# Patient Record
Sex: MALE | Race: BLACK OR AFRICAN AMERICAN | NOT HISPANIC OR LATINO | Employment: UNEMPLOYED | URBAN - METROPOLITAN AREA
[De-identification: names, ages, dates, MRNs, and addresses within clinical notes are randomized per-mention and may not be internally consistent; named-entity substitution may affect disease eponyms.]

---

## 2022-07-30 ENCOUNTER — APPOINTMENT (EMERGENCY)
Dept: CT IMAGING | Facility: HOSPITAL | Age: 42
End: 2022-07-30

## 2022-07-30 ENCOUNTER — HOSPITAL ENCOUNTER (EMERGENCY)
Facility: HOSPITAL | Age: 42
Discharge: HOME | End: 2022-07-30
Attending: EMERGENCY MEDICINE

## 2022-07-30 VITALS
DIASTOLIC BLOOD PRESSURE: 86 MMHG | RESPIRATION RATE: 18 BRPM | WEIGHT: 160.5 LBS | SYSTOLIC BLOOD PRESSURE: 135 MMHG | HEART RATE: 102 BPM | TEMPERATURE: 97.9 F | OXYGEN SATURATION: 98 %

## 2022-07-30 DIAGNOSIS — K40.90 RIGHT INGUINAL HERNIA: Primary | ICD-10-CM

## 2022-07-30 LAB
ALBUMIN SERPL BCP-MCNC: 4.5 G/DL (ref 3.5–5)
ALP SERPL-CCNC: 87 U/L (ref 43–122)
ALT SERPL W P-5'-P-CCNC: 37 U/L
ANION GAP SERPL CALCULATED.3IONS-SCNC: 7 MMOL/L (ref 5–14)
AST SERPL W P-5'-P-CCNC: 59 U/L (ref 17–59)
BASOPHILS # BLD AUTO: 0.07 THOUSANDS/ΜL (ref 0–0.1)
BASOPHILS NFR BLD AUTO: 1 % (ref 0–1)
BILIRUB SERPL-MCNC: 0.34 MG/DL (ref 0.2–1)
BUN SERPL-MCNC: 19 MG/DL (ref 5–25)
CALCIUM SERPL-MCNC: 9.4 MG/DL (ref 8.4–10.2)
CHLORIDE SERPL-SCNC: 104 MMOL/L (ref 96–108)
CO2 SERPL-SCNC: 28 MMOL/L (ref 21–32)
CREAT SERPL-MCNC: 1.11 MG/DL (ref 0.7–1.5)
EOSINOPHIL # BLD AUTO: 0.47 THOUSAND/ΜL (ref 0–0.61)
EOSINOPHIL NFR BLD AUTO: 4 % (ref 0–6)
ERYTHROCYTE [DISTWIDTH] IN BLOOD BY AUTOMATED COUNT: 13.7 % (ref 11.6–15.1)
GFR SERPL CREATININE-BSD FRML MDRD: 82 ML/MIN/1.73SQ M
GLUCOSE SERPL-MCNC: 104 MG/DL (ref 70–99)
HCT VFR BLD AUTO: 41.4 % (ref 36.5–49.3)
HGB BLD-MCNC: 14.5 G/DL (ref 12–17)
IMM GRANULOCYTES # BLD AUTO: 0.02 THOUSAND/UL (ref 0–0.2)
IMM GRANULOCYTES NFR BLD AUTO: 0 % (ref 0–2)
LACTATE SERPL-SCNC: 1.5 MMOL/L (ref 0.5–2)
LYMPHOCYTES # BLD AUTO: 2.85 THOUSANDS/ΜL (ref 0.6–4.47)
LYMPHOCYTES NFR BLD AUTO: 26 % (ref 14–44)
MAGNESIUM SERPL-MCNC: 1.9 MG/DL (ref 1.6–2.3)
MCH RBC QN AUTO: 27.3 PG (ref 26.8–34.3)
MCHC RBC AUTO-ENTMCNC: 35 G/DL (ref 31.4–37.4)
MCV RBC AUTO: 78 FL (ref 82–98)
MONOCYTES # BLD AUTO: 0.69 THOUSAND/ΜL (ref 0.17–1.22)
MONOCYTES NFR BLD AUTO: 6 % (ref 4–12)
NEUTROPHILS # BLD AUTO: 7.09 THOUSANDS/ΜL (ref 1.85–7.62)
NEUTS SEG NFR BLD AUTO: 63 % (ref 43–75)
NRBC BLD AUTO-RTO: 0 /100 WBCS
PHOSPHATE SERPL-MCNC: 4.3 MG/DL (ref 2.5–4.8)
PLATELET # BLD AUTO: 242 THOUSANDS/UL (ref 149–390)
PMV BLD AUTO: 9.5 FL (ref 8.9–12.7)
POTASSIUM SERPL-SCNC: 4.5 MMOL/L (ref 3.5–5.3)
PROT SERPL-MCNC: 7.6 G/DL (ref 6.4–8.4)
RBC # BLD AUTO: 5.32 MILLION/UL (ref 3.88–5.62)
SODIUM SERPL-SCNC: 139 MMOL/L (ref 135–147)
WBC # BLD AUTO: 11.19 THOUSAND/UL (ref 4.31–10.16)

## 2022-07-30 PROCEDURE — 83735 ASSAY OF MAGNESIUM: CPT | Performed by: EMERGENCY MEDICINE

## 2022-07-30 PROCEDURE — 96375 TX/PRO/DX INJ NEW DRUG ADDON: CPT

## 2022-07-30 PROCEDURE — 99285 EMERGENCY DEPT VISIT HI MDM: CPT | Performed by: EMERGENCY MEDICINE

## 2022-07-30 PROCEDURE — 99284 EMERGENCY DEPT VISIT MOD MDM: CPT

## 2022-07-30 PROCEDURE — 36415 COLL VENOUS BLD VENIPUNCTURE: CPT | Performed by: EMERGENCY MEDICINE

## 2022-07-30 PROCEDURE — 80053 COMPREHEN METABOLIC PANEL: CPT | Performed by: EMERGENCY MEDICINE

## 2022-07-30 PROCEDURE — 96374 THER/PROPH/DIAG INJ IV PUSH: CPT

## 2022-07-30 PROCEDURE — 74177 CT ABD & PELVIS W/CONTRAST: CPT

## 2022-07-30 PROCEDURE — 84100 ASSAY OF PHOSPHORUS: CPT | Performed by: EMERGENCY MEDICINE

## 2022-07-30 PROCEDURE — G1004 CDSM NDSC: HCPCS

## 2022-07-30 PROCEDURE — 83605 ASSAY OF LACTIC ACID: CPT | Performed by: EMERGENCY MEDICINE

## 2022-07-30 PROCEDURE — 85025 COMPLETE CBC W/AUTO DIFF WBC: CPT | Performed by: EMERGENCY MEDICINE

## 2022-07-30 PROCEDURE — 96361 HYDRATE IV INFUSION ADD-ON: CPT

## 2022-07-30 RX ORDER — KETOROLAC TROMETHAMINE 30 MG/ML
15 INJECTION, SOLUTION INTRAMUSCULAR; INTRAVENOUS ONCE
Status: DISCONTINUED | OUTPATIENT
Start: 2022-07-30 | End: 2022-07-30

## 2022-07-30 RX ORDER — NAPROXEN 375 MG/1
375 TABLET ORAL 2 TIMES DAILY WITH MEALS
Qty: 20 TABLET | Refills: 0 | Status: SHIPPED | OUTPATIENT
Start: 2022-07-30

## 2022-07-30 RX ORDER — KETOROLAC TROMETHAMINE 30 MG/ML
15 INJECTION, SOLUTION INTRAMUSCULAR; INTRAVENOUS ONCE
Status: COMPLETED | OUTPATIENT
Start: 2022-07-30 | End: 2022-07-30

## 2022-07-30 RX ORDER — MORPHINE SULFATE 4 MG/ML
4 INJECTION, SOLUTION INTRAMUSCULAR; INTRAVENOUS ONCE
Status: COMPLETED | OUTPATIENT
Start: 2022-07-30 | End: 2022-07-30

## 2022-07-30 RX ORDER — ONDANSETRON 2 MG/ML
4 INJECTION INTRAMUSCULAR; INTRAVENOUS ONCE
Status: COMPLETED | OUTPATIENT
Start: 2022-07-30 | End: 2022-07-30

## 2022-07-30 RX ADMIN — SODIUM CHLORIDE 1000 ML: 0.9 INJECTION, SOLUTION INTRAVENOUS at 14:09

## 2022-07-30 RX ADMIN — ONDANSETRON 4 MG: 2 INJECTION INTRAMUSCULAR; INTRAVENOUS at 14:10

## 2022-07-30 RX ADMIN — IOHEXOL 100 ML: 350 INJECTION, SOLUTION INTRAVENOUS at 14:36

## 2022-07-30 RX ADMIN — MORPHINE SULFATE 4 MG: 4 INJECTION INTRAVENOUS at 14:08

## 2022-07-30 RX ADMIN — KETOROLAC TROMETHAMINE 15 MG: 30 INJECTION, SOLUTION INTRAMUSCULAR; INTRAVENOUS at 15:28

## 2022-07-30 NOTE — ED NOTES
RN contacted [facility]/[address], they do not have an escort at this time to pick him up  [facility] house requested that we order a LYFT for this patient       Alissa Zhou RN  07/30/22 0132

## 2022-07-30 NOTE — ED PROVIDER NOTES
History  Chief Complaint   Patient presents with    Abdominal Pain    Groin Pain     Right sided into right groin  States feels tearing and moving  Pain began 1hr after eating lunch  +N  States took tylenol approx 2hrs PTA  53-year-old male presented emergency room with right inguinal pain  Patient notes this started suddenly on the last 1-2 hours  Has had a right inguinal hernia for a few months now  Denies fevers chills cough congestion rhinorrhea  Denies scrotal pain  Denies dysuria hematuria  Nausea but no vomiting  History provided by:  Patient  Abdominal Pain  Pain location:  RLQ  Pain quality: aching and sharp    Pain radiates to:  Does not radiate  Pain severity:  Severe  Onset quality:  Sudden  Duration:  2 hours  Timing:  Constant  Progression:  Worsening  Chronicity:  New  Relieved by:  Nothing  Worsened by:  Nothing  Associated symptoms: nausea    Associated symptoms: no chest pain, no chills, no cough, no dysuria, no fever, no hematuria, no shortness of breath, no sore throat and no vomiting    Groin Pain  Presenting symptoms: no dysuria    Associated symptoms: abdominal pain, groin pain and nausea    Associated symptoms: no fever, no hematuria and no vomiting        None       History reviewed  No pertinent past medical history  History reviewed  No pertinent surgical history  History reviewed  No pertinent family history  I have reviewed and agree with the history as documented  E-Cigarette/Vaping     E-Cigarette/Vaping Substances     Social History     Tobacco Use    Smoking status: Current Every Day Smoker     Packs/day: 0 25     Types: Cigarettes    Smokeless tobacco: Never Used   Substance Use Topics    Alcohol use: Not Currently     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    Drug use: Never       Review of Systems   Constitutional: Negative for chills and fever  HENT: Negative for ear pain and sore throat  Eyes: Negative for pain and visual disturbance  Respiratory: Negative for cough and shortness of breath  Cardiovascular: Negative for chest pain and palpitations  Gastrointestinal: Positive for abdominal pain and nausea  Negative for vomiting  Genitourinary: Negative for dysuria and hematuria  Musculoskeletal: Negative for arthralgias and back pain  Skin: Negative for color change and rash  Neurological: Negative for seizures and syncope  All other systems reviewed and are negative  Physical Exam  Physical Exam  Vitals and nursing note reviewed  Constitutional:       Appearance: He is well-developed  HENT:      Head: Normocephalic and atraumatic  Nose: Nose normal       Mouth/Throat:      Mouth: Mucous membranes are moist    Eyes:      Conjunctiva/sclera: Conjunctivae normal    Cardiovascular:      Rate and Rhythm: Normal rate and regular rhythm  Heart sounds: No murmur heard  Pulmonary:      Effort: Pulmonary effort is normal  No respiratory distress  Breath sounds: Normal breath sounds  Abdominal:      General: Abdomen is flat  Palpations: Abdomen is soft  Tenderness: There is no abdominal tenderness  Hernia: A hernia is present  Comments: Right groin hernia, unable to reduce, tender to palpation  No overlying erythema or warmth  Genitourinary:     Penis: Normal        Testes: Normal    Musculoskeletal:      Cervical back: Neck supple  Skin:     General: Skin is warm and dry  Capillary Refill: Capillary refill takes less than 2 seconds  Neurological:      Mental Status: He is alert and oriented to person, place, and time           Vital Signs  ED Triage Vitals   Temperature Pulse Respirations Blood Pressure SpO2   07/30/22 1354 07/30/22 1354 07/30/22 1354 07/30/22 1354 07/30/22 1354   97 9 °F (36 6 °C) 102 18 135/86 98 %      Temp Source Heart Rate Source Patient Position - Orthostatic VS BP Location FiO2 (%)   07/30/22 1354 07/30/22 1354 07/30/22 1354 07/30/22 1354 --   Tympanic Monitor Sitting Left arm       Pain Score       07/30/22 1408       10 - Worst Possible Pain           Vitals:    07/30/22 1354   BP: 135/86   Pulse: 102   Patient Position - Orthostatic VS: Sitting         Visual Acuity      ED Medications  Medications   sodium chloride 0 9 % bolus 1,000 mL (1,000 mL Intravenous New Bag 7/30/22 1409)   morphine injection 4 mg (4 mg Intravenous Given 7/30/22 1408)   ondansetron (ZOFRAN) injection 4 mg (4 mg Intravenous Given 7/30/22 1410)   iohexol (OMNIPAQUE) 350 MG/ML injection (SINGLE-DOSE) 100 mL (100 mL Intravenous Given 7/30/22 1436)   ketorolac (TORADOL) injection 15 mg (15 mg Intravenous Given 7/30/22 1528)       Diagnostic Studies  Results Reviewed     Procedure Component Value Units Date/Time    Magnesium [[id number]]  (Normal) Collected: 07/30/22 1403    Lab Status: Final result Specimen: Blood from Arm, Left Updated: 07/30/22 1426     Magnesium 1 9 mg/dL     Phosphorus [[id number]]  (Normal) Collected: 07/30/22 1403    Lab Status: Final result Specimen: Blood from Arm, Left Updated: 07/30/22 1426     Phosphorus 4 3 mg/dL     Comprehensive metabolic panel [[id number]]  (Abnormal) Collected: 07/30/22 1403    Lab Status: Final result Specimen: Blood from Arm, Left Updated: 07/30/22 1426     Sodium 139 mmol/L      Potassium 4 5 mmol/L      Chloride 104 mmol/L      CO2 28 mmol/L      ANION GAP 7 mmol/L      BUN 19 mg/dL      Creatinine 1 11 mg/dL      Glucose 104 mg/dL      Calcium 9 4 mg/dL      AST 59 U/L      ALT 37 U/L      Alkaline Phosphatase 87 U/L      Total Protein 7 6 g/dL      Albumin 4 5 g/dL      Total Bilirubin 0 34 mg/dL      eGFR 82 ml/min/1 73sq m     Narrative:      Oscar guidelines for Chronic Kidney Disease (CKD):     Stage 1 with normal or high GFR (GFR > 90 mL/min/1 73 square meters)    Stage 2 Mild CKD (GFR = 60-89 mL/min/1 73 square meters)    Stage 3A Moderate CKD (GFR = 45-59 mL/min/1 73 square meters)    Stage 3B Moderate CKD (GFR = 30-44 mL/min/1 73 square meters)    Stage 4 Severe CKD (GFR = 15-29 mL/min/1 73 square meters)    Stage 5 End Stage CKD (GFR <15 mL/min/1 73 square meters)  Note: GFR calculation is accurate only with a steady state creatinine    Lactic acid [[id number]]  (Normal) Collected: 07/30/22 1403    Lab Status: Final result Specimen: Blood from Arm, Left Updated: 07/30/22 1424     LACTIC ACID 1 5 mmol/L     Narrative:      Result may be elevated if tourniquet was used during collection  CBC and differential [[id number]]  (Abnormal) Collected: 07/30/22 1403    Lab Status: Final result Specimen: Blood from Arm, Left Updated: 07/30/22 1411     WBC 11 19 Thousand/uL      RBC 5 32 Million/uL      Hemoglobin 14 5 g/dL      Hematocrit 41 4 %      MCV 78 fL      MCH 27 3 pg      MCHC 35 0 g/dL      RDW 13 7 %      MPV 9 5 fL      Platelets 493 Thousands/uL      nRBC 0 /100 WBCs      Neutrophils Relative 63 %      Immat GRANS % 0 %      Lymphocytes Relative 26 %      Monocytes Relative 6 %      Eosinophils Relative 4 %      Basophils Relative 1 %      Neutrophils Absolute 7 09 Thousands/µL      Immature Grans Absolute 0 02 Thousand/uL      Lymphocytes Absolute 2 85 Thousands/µL      Monocytes Absolute 0 69 Thousand/µL      Eosinophils Absolute 0 47 Thousand/µL      Basophils Absolute 0 07 Thousands/µL                  CT abdomen pelvis with contrast   Final Result by Eloisa Bolton MD (07/30 8944)      Small right inguinal hernia containing a short segment of small intestine without evidence of obstruction  Workstation performed: [id number]                    Procedures  Procedures         ED Course                               SBIRT 20yo+    Flowsheet Row Most Recent Value   SBIRT (23 yo +)    In order to provide better care to our patients, we are screening all of our patients for alcohol and drug use  Would it be okay to ask you these screening questions?  Unable to answer at this time Filed at: 07/30/2022 1410                    [facility]  Number of Diagnoses or Management Options  Right inguinal hernia  Diagnosis management comments: After morphine I was able to reduce the hernia  CT shows non strangulated right inguinal hernia  Will discharge with outpatient surgery follow-up  Disposition  Final diagnoses:   Right inguinal hernia     Time reflects when diagnosis was documented in both MDM as applicable and the Disposition within this note     Time User Action Codes Description Comment    7/30/2022  3:41 PM Bigg Mac Candy [K40 90] Right inguinal hernia       ED Disposition     ED Disposition   Discharge    Condition   Stable    Date/Time   Sat Jul 30, 2022  3:40 PM    Comment   Crow Gallagher discharge to home/self care                 Follow-up Information     Follow up With Specialties Details Why Contact Info    Hamlet Curiel MD General Surgery   [address] [place], [place] [phone]            Patient's Medications   Discharge Prescriptions    NAPROXEN (NAPROSYN) 375 MG TABLET    Take 1 tablet (375 mg total) by mouth 2 (two) times a day with meals       Start Date: 7/30/2022 End Date: --       Order Dose: 375 mg       Quantity: 20 tablet    Refills: 0           PDMP Review     None          ED Provider  Electronically Signed by           Dennis Islas MD  07/30/22 4032

## 2022-08-10 ENCOUNTER — CONSULT (OUTPATIENT)
Dept: SURGERY | Facility: CLINIC | Age: 42
End: 2022-08-10
Payer: COMMERCIAL

## 2022-08-10 VITALS
TEMPERATURE: 99 F | OXYGEN SATURATION: 99 % | DIASTOLIC BLOOD PRESSURE: 78 MMHG | HEART RATE: 80 BPM | WEIGHT: 156 LBS | SYSTOLIC BLOOD PRESSURE: 132 MMHG

## 2022-08-10 DIAGNOSIS — K40.90 RIGHT INGUINAL HERNIA: ICD-10-CM

## 2022-08-10 PROCEDURE — 99244 OFF/OP CNSLTJ NEW/EST MOD 40: CPT | Performed by: SPECIALIST

## 2022-08-10 RX ORDER — CEFAZOLIN SODIUM 2 G/50ML
2000 SOLUTION INTRAVENOUS ONCE
Status: CANCELLED | OUTPATIENT
Start: 2022-08-26

## 2022-08-10 NOTE — H&P
Chief Complaint:  Recurrent right inguinal hernia      History of Present Illness:  Patient is a 26-year-old black male who was recently seen in the emergency room with right groin pain  He said he was doing somewhat strenuous activity when he felt tearing and groin pain on the right side  He since developed a bulge in the area  He improved with conservative therapy in the emergency room and he was referred to our office for evaluation  In discussing the situation with the patient he says he has fairly significant discomfort when he moves walks etcetera  He denies any nausea vomiting diarrhea constipation  Patient states he had a right inguinal hernia repair when he was a an infant  Past Medical History: History reviewed  No pertinent past medical history  Past Surgical History:  History reviewed  No pertinent surgical history  Right inguinal herniorrhaphy as an infant    Allergies:  No Known Allergies      Medications:    Current Outpatient Medications:     naproxen (NAPROSYN) 375 mg tablet, Take 1 tablet (375 mg total) by mouth 2 (two) times a day with meals, Disp: 20 tablet, Rfl: 0      Social History:  Social History     Social History     Substance and Sexual Activity   Alcohol Use Not Currently    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     Social History     Substance and Sexual Activity   Drug Use Never     Social History     Tobacco Use   Smoking Status Current Every Day Smoker    Packs/day: 0 25    Types: Cigarettes   Smokeless Tobacco Never Used         Family History:  History reviewed  No pertinent family history  Review of Systems:    Right groin pain as per the HPI  No weight loss weight gain fever chills night sweats chest pain nausea vomiting diarrhea constipation shortness of breath headaches blurry vision double vision sore throat chronic cough dysuria hematuria      Vitals:  Vitals:    08/10/22 1241   BP: 132/78   Pulse: 80   Temp: 99 °F (37 2 °C)   SpO2: 99%       Physical Exam:  The patient is a young adult black male 5 ft 6 in 156 lb  He is awake alert no distress    Vital signs as above    Skin warm dry  Head normocephalic and atraumatic  Eyes AUREA a m  Intact  Ears and nose within normal limits  Throat gag reflex intact  Neck no masses thyromegaly lymphadenopathy palpable  Back no CVA or spinal tenderness  Lungs clear to a and P  Cor regular rate and rhythm no murmurs carotid bruits  Abdomen flat firm nontender left groin shows no evidence of a hernia  Right groin demonstrates a bulge and also a small well-healed incision just above the bulge  Bulge is reducible with the patient in a supine position  No other abdominal masses or hernias noted  Extremities negative CC E  Neurologically A&O x3 cranial nerves 2-12 intact  Lymphatics no lymphadenopathy palpable      Lab Results: I have personally reviewed pertinent reports  See below  Imaging: I have personally reviewed pertinent imaging studies primarily CT scan of the abdomen and pelvis  EKG, Pathology, and Other Studies: I have personally reviewed pertinent reports  No visits with results within 1 Day(s) from this visit     Latest known visit with results is:   Admission on 07/30/2022, Discharged on 07/30/2022   Component Date Value    WBC 07/30/2022 11 19 (A)    RBC 07/30/2022 5 32     Hemoglobin 07/30/2022 14 5     Hematocrit 07/30/2022 41 4     MCV 07/30/2022 78 (A)    MCH 07/30/2022 27 3     MCHC 07/30/2022 35 0     RDW 07/30/2022 13 7     MPV 07/30/2022 9 5     Platelets 26/39/8334 242     nRBC 07/30/2022 0     Neutrophils Relative 07/30/2022 63     Immat GRANS % 07/30/2022 0     Lymphocytes Relative 07/30/2022 26     Monocytes Relative 07/30/2022 6     Eosinophils Relative 07/30/2022 4     Basophils Relative 07/30/2022 1     Neutrophils Absolute 07/30/2022 7 09     Immature Grans Absolute 07/30/2022 0 02     Lymphocytes Absolute 07/30/2022 2 85     Monocytes Absolute 07/30/2022 0 69     Eosinophils Absolute 07/30/2022 0 47     Basophils Absolute 07/30/2022 0 07     Sodium 07/30/2022 139     Potassium 07/30/2022 4 5     Chloride 07/30/2022 104     CO2 07/30/2022 28     ANION GAP 07/30/2022 7     BUN 07/30/2022 19     Creatinine 07/30/2022 1 11     Glucose 07/30/2022 104 (A)    Calcium 07/30/2022 9 4     AST 07/30/2022 59     ALT 07/30/2022 37     Alkaline Phosphatase 07/30/2022 87     Total Protein 07/30/2022 7 6     Albumin 07/30/2022 4 5     Total Bilirubin 07/30/2022 0 34     eGFR 07/30/2022 82     Phosphorus 07/30/2022 4 3     Magnesium 07/30/2022 1 9     LACTIC ACID 07/30/2022 1 5          Impression:  Recurrent right inguinal hernia  Symptomatic  Plan:  Repair open with mesh under local with IV sedation at the earliest possible date

## 2022-08-10 NOTE — LETTER
August 10, 2022     [place], [address] [facility] [address]    Patient: Rusty South   YOB: 1980   Date of Visit: 8/10/2022       Dear Betty Bermeo,    Thank you for referring Rusty South to me for evaluation  Below are the relevant portions of my H&P  If you have questions, please do not hesitate to call me  I look forward to following Stanley Dakins along with you  Sincerely,    Valeri Peck MD        CC: No Recipients  Finn Clark MD  8/10/2022  5:24 PM  Signed  Chief Complaint:  Recurrent right inguinal hernia      History of Present Illness:  Patient is a 51-year-old black male who was recently seen in the emergency room with right groin pain  He said he was doing somewhat strenuous activity when he felt tearing and groin pain on the right side  He since developed a bulge in the area  He improved with conservative therapy in the emergency room and he was referred to our office for evaluation  In discussing the situation with the patient he says he has fairly significant discomfort when he moves walks etcetera  He denies any nausea vomiting diarrhea constipation  Patient states he had a right inguinal hernia repair when he was a an infant  Past Medical History: History reviewed  No pertinent past medical history  Past Surgical History:  History reviewed  No pertinent surgical history    Right inguinal herniorrhaphy as an infant    Allergies:  No Known Allergies      Medications:    Current Outpatient Medications:     naproxen (NAPROSYN) 375 mg tablet, Take 1 tablet (375 mg total) by mouth 2 (two) times a day with meals, Disp: 20 tablet, Rfl: 0      Social History:  Social History     Social History     Substance and Sexual Activity   Alcohol Use Not Currently    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     Social History     Substance and Sexual Activity   Drug Use Never     Social History     Tobacco Use   Smoking Status Current Every Day Smoker    Packs/day: 0 25    Types: Cigarettes   Smokeless Tobacco Never Used         Family History:  History reviewed  No pertinent family history  Review of Systems:    Right groin pain as per the HPI  No weight loss weight gain fever chills night sweats chest pain nausea vomiting diarrhea constipation shortness of breath headaches blurry vision double vision sore throat chronic cough dysuria hematuria  Vitals:  Vitals:    08/10/22 1241   BP: 132/78   Pulse: 80   Temp: 99 °F (37 2 °C)   SpO2: 99%       Physical Exam:  The patient is a young adult black male 5 ft 6 in 156 lb  He is awake alert no distress    Vital signs as above    Skin warm dry  Head normocephalic and atraumatic  Eyes AUREA a m  Intact  Ears and nose within normal limits  Throat gag reflex intact  Neck no masses thyromegaly lymphadenopathy palpable  Back no CVA or spinal tenderness  Lungs clear to a and P  Cor regular rate and rhythm no murmurs carotid bruits  Abdomen flat firm nontender left groin shows no evidence of a hernia  Right groin demonstrates a bulge and also a small well-healed incision just above the bulge  Bulge is reducible with the patient in a supine position  No other abdominal masses or hernias noted  Extremities negative CC E  Neurologically A&O x3 cranial nerves 2-12 intact  Lymphatics no lymphadenopathy palpable      Lab Results: I have personally reviewed pertinent reports  See below  Imaging: I have personally reviewed pertinent imaging studies primarily CT scan of the abdomen and pelvis  EKG, Pathology, and Other Studies: I have personally reviewed pertinent reports  No visits with results within 1 Day(s) from this visit     Latest known visit with results is:   Admission on 07/30/2022, Discharged on 07/30/2022   Component Date Value    WBC 07/30/2022 11 19 (A)    RBC 07/30/2022 5 32     Hemoglobin 07/30/2022 14 5     Hematocrit 07/30/2022 41 4     MCV 07/30/2022 78 (A)    MCH 07/30/2022 27 3     MCHC 07/30/2022 35 0     RDW 07/30/2022 13 7     MPV 07/30/2022 9 5     Platelets 77/00/1427 242     nRBC 07/30/2022 0     Neutrophils Relative 07/30/2022 63     Immat GRANS % 07/30/2022 0     Lymphocytes Relative 07/30/2022 26     Monocytes Relative 07/30/2022 6     Eosinophils Relative 07/30/2022 4     Basophils Relative 07/30/2022 1     Neutrophils Absolute 07/30/2022 7 09     Immature Grans Absolute 07/30/2022 0 02     Lymphocytes Absolute 07/30/2022 2 85     Monocytes Absolute 07/30/2022 0 69     Eosinophils Absolute 07/30/2022 0 47     Basophils Absolute 07/30/2022 0 07     Sodium 07/30/2022 139     Potassium 07/30/2022 4 5     Chloride 07/30/2022 104     CO2 07/30/2022 28     ANION GAP 07/30/2022 7     BUN 07/30/2022 19     Creatinine 07/30/2022 1 11     Glucose 07/30/2022 104 (A)    Calcium 07/30/2022 9 4     AST 07/30/2022 59     ALT 07/30/2022 37     Alkaline Phosphatase 07/30/2022 87     Total Protein 07/30/2022 7 6     Albumin 07/30/2022 4 5     Total Bilirubin 07/30/2022 0 34     eGFR 07/30/2022 82     Phosphorus 07/30/2022 4 3     Magnesium 07/30/2022 1 9     LACTIC ACID 07/30/2022 1 5          Impression:  Recurrent right inguinal hernia  Symptomatic  Plan:  Repair open with mesh under local with IV sedation at the earliest possible date

## 2022-08-14 ENCOUNTER — HOSPITAL ENCOUNTER (EMERGENCY)
Facility: HOSPITAL | Age: 42
Discharge: HOME | End: 2022-08-14
Attending: EMERGENCY MEDICINE
Payer: COMMERCIAL

## 2022-08-14 ENCOUNTER — APPOINTMENT (EMERGENCY)
Dept: CT IMAGING | Facility: HOSPITAL | Age: 42
End: 2022-08-14
Payer: COMMERCIAL

## 2022-08-14 VITALS
HEART RATE: 82 BPM | DIASTOLIC BLOOD PRESSURE: 91 MMHG | WEIGHT: 162 LBS | SYSTOLIC BLOOD PRESSURE: 140 MMHG | TEMPERATURE: 98.2 F | RESPIRATION RATE: 16 BRPM | OXYGEN SATURATION: 98 %

## 2022-08-14 DIAGNOSIS — K40.90 INGUINAL HERNIA: Primary | ICD-10-CM

## 2022-08-14 DIAGNOSIS — R10.9 ABDOMINAL PAIN: ICD-10-CM

## 2022-08-14 LAB
ALBUMIN SERPL BCP-MCNC: 4 G/DL (ref 3.5–5)
ALP SERPL-CCNC: 82 U/L (ref 46–116)
ALT SERPL W P-5'-P-CCNC: 28 U/L (ref 12–78)
ANION GAP SERPL CALCULATED.3IONS-SCNC: 8 MMOL/L (ref 4–13)
AST SERPL W P-5'-P-CCNC: 17 U/L (ref 5–45)
BASOPHILS # BLD AUTO: 0.08 THOUSANDS/ΜL (ref 0–0.1)
BASOPHILS NFR BLD AUTO: 1 % (ref 0–1)
BILIRUB SERPL-MCNC: 0.31 MG/DL (ref 0.2–1)
BILIRUB UR QL STRIP: NEGATIVE
BUN SERPL-MCNC: 18 MG/DL (ref 5–25)
CALCIUM SERPL-MCNC: 9.5 MG/DL (ref 8.3–10.1)
CHLORIDE SERPL-SCNC: 105 MMOL/L (ref 96–108)
CLARITY UR: CLEAR
CO2 SERPL-SCNC: 26 MMOL/L (ref 21–32)
COLOR UR: YELLOW
CREAT SERPL-MCNC: 1.27 MG/DL (ref 0.6–1.3)
EOSINOPHIL # BLD AUTO: 0.23 THOUSAND/ΜL (ref 0–0.61)
EOSINOPHIL NFR BLD AUTO: 2 % (ref 0–6)
ERYTHROCYTE [DISTWIDTH] IN BLOOD BY AUTOMATED COUNT: 13.4 % (ref 11.6–15.1)
GFR SERPL CREATININE-BSD FRML MDRD: 70 ML/MIN/1.73SQ M
GLUCOSE SERPL-MCNC: 131 MG/DL (ref 65–140)
GLUCOSE UR STRIP-MCNC: NEGATIVE MG/DL
HCT VFR BLD AUTO: 39.5 % (ref 36.5–49.3)
HGB BLD-MCNC: 14 G/DL (ref 12–17)
HGB UR QL STRIP.AUTO: NEGATIVE
IMM GRANULOCYTES # BLD AUTO: 0.04 THOUSAND/UL (ref 0–0.2)
IMM GRANULOCYTES NFR BLD AUTO: 0 % (ref 0–2)
KETONES UR STRIP-MCNC: NEGATIVE MG/DL
LACTATE SERPL-SCNC: 1.7 MMOL/L (ref 0.5–2)
LEUKOCYTE ESTERASE UR QL STRIP: NEGATIVE
LYMPHOCYTES # BLD AUTO: 2.69 THOUSANDS/ΜL (ref 0.6–4.47)
LYMPHOCYTES NFR BLD AUTO: 23 % (ref 14–44)
MAGNESIUM SERPL-MCNC: 1.9 MG/DL (ref 1.6–2.6)
MCH RBC QN AUTO: 27.4 PG (ref 26.8–34.3)
MCHC RBC AUTO-ENTMCNC: 35.4 G/DL (ref 31.4–37.4)
MCV RBC AUTO: 77 FL (ref 82–98)
MONOCYTES # BLD AUTO: 0.67 THOUSAND/ΜL (ref 0.17–1.22)
MONOCYTES NFR BLD AUTO: 6 % (ref 4–12)
NEUTROPHILS # BLD AUTO: 8.08 THOUSANDS/ΜL (ref 1.85–7.62)
NEUTS SEG NFR BLD AUTO: 68 % (ref 43–75)
NITRITE UR QL STRIP: NEGATIVE
NRBC BLD AUTO-RTO: 0 /100 WBCS
PH UR STRIP.AUTO: 6.5 [PH]
PLATELET # BLD AUTO: 227 THOUSANDS/UL (ref 149–390)
PMV BLD AUTO: 10.3 FL (ref 8.9–12.7)
POTASSIUM SERPL-SCNC: 4.1 MMOL/L (ref 3.5–5.3)
PROT SERPL-MCNC: 7.1 G/DL (ref 6.4–8.4)
PROT UR STRIP-MCNC: NEGATIVE MG/DL
RBC # BLD AUTO: 5.11 MILLION/UL (ref 3.88–5.62)
SODIUM SERPL-SCNC: 139 MMOL/L (ref 135–147)
SP GR UR STRIP.AUTO: 1.01 (ref 1–1.03)
UROBILINOGEN UR QL STRIP.AUTO: 0.2 E.U./DL
WBC # BLD AUTO: 11.79 THOUSAND/UL (ref 4.31–10.16)

## 2022-08-14 PROCEDURE — 74177 CT ABD & PELVIS W/CONTRAST: CPT

## 2022-08-14 PROCEDURE — G1004 CDSM NDSC: HCPCS

## 2022-08-14 PROCEDURE — 80053 COMPREHEN METABOLIC PANEL: CPT | Performed by: EMERGENCY MEDICINE

## 2022-08-14 PROCEDURE — 83605 ASSAY OF LACTIC ACID: CPT | Performed by: EMERGENCY MEDICINE

## 2022-08-14 PROCEDURE — 96374 THER/PROPH/DIAG INJ IV PUSH: CPT

## 2022-08-14 PROCEDURE — 99244 OFF/OP CNSLTJ NEW/EST MOD 40: CPT | Performed by: SURGERY

## 2022-08-14 PROCEDURE — 96375 TX/PRO/DX INJ NEW DRUG ADDON: CPT

## 2022-08-14 PROCEDURE — 99284 EMERGENCY DEPT VISIT MOD MDM: CPT | Performed by: EMERGENCY MEDICINE

## 2022-08-14 PROCEDURE — 83735 ASSAY OF MAGNESIUM: CPT | Performed by: EMERGENCY MEDICINE

## 2022-08-14 PROCEDURE — 96361 HYDRATE IV INFUSION ADD-ON: CPT

## 2022-08-14 PROCEDURE — 36415 COLL VENOUS BLD VENIPUNCTURE: CPT | Performed by: EMERGENCY MEDICINE

## 2022-08-14 PROCEDURE — 81003 URINALYSIS AUTO W/O SCOPE: CPT | Performed by: EMERGENCY MEDICINE

## 2022-08-14 PROCEDURE — 85025 COMPLETE CBC W/AUTO DIFF WBC: CPT | Performed by: EMERGENCY MEDICINE

## 2022-08-14 PROCEDURE — 99285 EMERGENCY DEPT VISIT HI MDM: CPT

## 2022-08-14 PROCEDURE — 96376 TX/PRO/DX INJ SAME DRUG ADON: CPT

## 2022-08-14 RX ORDER — LORAZEPAM 2 MG/ML
0.5 INJECTION INTRAMUSCULAR ONCE
Status: COMPLETED | OUTPATIENT
Start: 2022-08-14 | End: 2022-08-14

## 2022-08-14 RX ORDER — KETOROLAC TROMETHAMINE 30 MG/ML
1 INJECTION, SOLUTION INTRAMUSCULAR; INTRAVENOUS ONCE
Status: COMPLETED | OUTPATIENT
Start: 2022-08-14 | End: 2022-08-14

## 2022-08-14 RX ORDER — HYDROMORPHONE HCL/PF 1 MG/ML
1 SYRINGE (ML) INJECTION ONCE
Status: COMPLETED | OUTPATIENT
Start: 2022-08-14 | End: 2022-08-14

## 2022-08-14 RX ORDER — FENTANYL CITRATE 50 UG/ML
50 INJECTION, SOLUTION INTRAMUSCULAR; INTRAVENOUS ONCE
Status: COMPLETED | OUTPATIENT
Start: 2022-08-14 | End: 2022-08-14

## 2022-08-14 RX ORDER — ONDANSETRON 2 MG/ML
4 INJECTION INTRAMUSCULAR; INTRAVENOUS ONCE
Status: COMPLETED | OUTPATIENT
Start: 2022-08-14 | End: 2022-08-14

## 2022-08-14 RX ORDER — TRAMADOL HYDROCHLORIDE 50 MG/1
50 TABLET ORAL EVERY 6 HOURS PRN
Qty: 8 TABLET | Refills: 0 | Status: SHIPPED | OUTPATIENT
Start: 2022-08-14 | End: 2022-08-16

## 2022-08-14 RX ADMIN — HYDROMORPHONE HYDROCHLORIDE 1 MG: 1 INJECTION, SOLUTION INTRAMUSCULAR; INTRAVENOUS; SUBCUTANEOUS at 20:35

## 2022-08-14 RX ADMIN — IOHEXOL 70 ML: 350 INJECTION, SOLUTION INTRAVENOUS at 21:05

## 2022-08-14 RX ADMIN — ONDANSETRON 4 MG: 2 INJECTION INTRAMUSCULAR; INTRAVENOUS at 19:35

## 2022-08-14 RX ADMIN — SODIUM CHLORIDE 1000 ML: 0.9 INJECTION, SOLUTION INTRAVENOUS at 19:38

## 2022-08-14 RX ADMIN — FENTANYL CITRATE 50 MCG: 50 INJECTION, SOLUTION INTRAMUSCULAR; INTRAVENOUS at 20:14

## 2022-08-14 RX ADMIN — HYDROMORPHONE HYDROCHLORIDE 1 MG: 1 INJECTION, SOLUTION INTRAMUSCULAR; INTRAVENOUS; SUBCUTANEOUS at 19:36

## 2022-08-14 RX ADMIN — LORAZEPAM 0.5 MG: 2 INJECTION INTRAMUSCULAR; INTRAVENOUS at 20:46

## 2022-08-14 RX ADMIN — FENTANYL CITRATE 50 MCG: 50 INJECTION, SOLUTION INTRAMUSCULAR; INTRAVENOUS at 22:01

## 2022-08-14 NOTE — ED PROVIDER NOTES
History  Chief Complaint   Patient presents with    Abdominal Pain     Per EMS pt walked up 4 stair then started with acute abd pain RLQ  Hx of hernia tear same area x1 month ago took tylenol pre hospital was given Toradol by EMS     Patient is a 82-year-old male otherwise healthy coming in today with sudden onset of right groin pain  Patient states he had an inguinal hernia repair went down when he was a child  He was due for scheduled hernia up this upcoming month  He is walking up the stairs when he felt a pop and he has exquisite pain to the right groin  He has no fevers chills  He has no nausea vomiting diarrhea  He is urinating well  He has no trauma to the site  History provided by:  Patient and EMS personnel   used: No    Abdominal Pain  Pain location:  RLQ  Pain quality: cramping, gnawing, pressure and throbbing    Pain radiates to:  Does not radiate  Pain severity:  Moderate  Onset quality:  Sudden  Timing:  Constant  Chronicity:  New  Context: not alcohol use, not awakening from sleep, not diet changes, not eating, not laxative use, not medication withdrawal, not previous surgeries, not recent illness, not recent sexual activity, not recent travel, not retching, not sick contacts, not suspicious food intake and not trauma    Relieved by:  Nothing  Worsened by:  Nothing  Ineffective treatments:  Acetaminophen, OTC medications and NSAIDs  Associated symptoms: no anorexia, no belching, no chest pain, no chills, no constipation, no cough, no diarrhea, no dysuria, no fatigue, no fever, no flatus, no hematemesis, no hematochezia, no hematuria, no nausea, no shortness of breath, no sore throat and no vomiting    Risk factors: no alcohol abuse, no aspirin use, not elderly, has not had multiple surgeries, no NSAID use, not obese and no recent hospitalization        Prior to Admission Medications   Prescriptions Last Dose Informant Patient Reported?  Taking?   naproxen (NAPROSYN) 375 mg tablet   No No   Sig: Take 1 tablet (375 mg total) by mouth 2 (two) times a day with meals      Facility-Administered Medications: None       History reviewed  No pertinent past medical history  History reviewed  No pertinent surgical history  History reviewed  No pertinent family history  I have reviewed and agree with the history as documented  E-Cigarette/Vaping    E-Cigarette Use Never User      E-Cigarette/Vaping Substances    Nicotine No     THC No     CBD No     Flavoring No     Other No     Unknown No      Social History     Tobacco Use    Smoking status: Current Every Day Smoker     Packs/day: 0 25     Types: Cigarettes    Smokeless tobacco: Never Used   Vaping Use    Vaping Use: Never used   Substance Use Topics    Alcohol use: Not Currently     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    Drug use: Never       Review of Systems   Constitutional: Negative  Negative for chills, fatigue and fever  HENT: Negative  Negative for ear pain and sore throat  Eyes: Negative for pain and visual disturbance  Respiratory: Negative  Negative for cough and shortness of breath  Cardiovascular: Negative for chest pain and palpitations  Gastrointestinal: Positive for abdominal pain  Negative for anorexia, constipation, diarrhea, flatus, hematemesis, hematochezia, nausea and vomiting  Genitourinary: Negative  Negative for dysuria and hematuria  Musculoskeletal: Negative  Negative for arthralgias and back pain  Skin: Negative  Negative for color change and rash  Neurological: Negative  Negative for seizures and syncope  Hematological: Negative  Psychiatric/Behavioral: Negative  All other systems reviewed and are negative  Physical Exam  Physical Exam  Vitals and nursing note reviewed  Exam conducted with a chaperone present  Constitutional:       General: He is in acute distress  Appearance: He is well-developed  HENT:      Head: Normocephalic and atraumatic  Comments: Patient maintaining airway and secretions  No stridor   No brawniness under tongue  Eyes:      Extraocular Movements: Extraocular movements intact  Conjunctiva/sclera: Conjunctivae normal       Pupils: Pupils are equal, round, and reactive to light  Cardiovascular:      Rate and Rhythm: Normal rate and regular rhythm  Pulses:           Radial pulses are 2+ on the right side and 2+ on the left side  Dorsalis pedis pulses are 2+ on the right side and 2+ on the left side  Heart sounds: Normal heart sounds, S1 normal and S2 normal  No murmur heard  Pulmonary:      Effort: Pulmonary effort is normal  No respiratory distress  Breath sounds: Normal breath sounds  Abdominal:      Palpations: Abdomen is soft  Tenderness: There is no abdominal tenderness  Hernia: A hernia is present  Hernia is present in the right inguinal area  Genitourinary:     Penis: Normal        Testes: Normal    Musculoskeletal:      Cervical back: Neck supple  Right lower leg: No edema  Left lower leg: No edema  Skin:     General: Skin is warm and dry  Capillary Refill: Capillary refill takes less than 2 seconds  Neurological:      General: No focal deficit present  Mental Status: He is alert and oriented to person, place, and time  GCS: GCS eye subscore is 4  GCS verbal subscore is 5  GCS motor subscore is 6  Cranial Nerves: Cranial nerves are intact  Sensory: Sensation is intact  Motor: Motor function is intact  Coordination: Coordination is intact     Psychiatric:         Mood and Affect: Mood normal          Behavior: Behavior normal          Vital Signs  ED Triage Vitals [08/14/22 1921]   Temperature Pulse Respirations Blood Pressure SpO2   98 2 °F (36 8 °C) 88 20 152/79 98 %      Temp Source Heart Rate Source Patient Position - Orthostatic VS BP Location FiO2 (%)   Oral Monitor Sitting Right arm --      Pain Score       8 Vitals:    08/14/22 1921   BP: 152/79   Pulse: 88   Patient Position - Orthostatic VS: Sitting         Visual Acuity      ED Medications  Medications   sodium chloride 0 9 % bolus 1,000 mL (1,000 mL Intravenous New Bag 8/14/22 1938)   fentanyl citrate (PF) 100 MCG/2ML 50 mcg (has no administration in time range)   ketorolac (FOR EMS ONLY) (TORADOL) injection 30 mg (0 mg Does not apply Given to EMS 8/14/22 1940)   HYDROmorphone (DILAUDID) injection 1 mg (1 mg Intravenous Given 8/14/22 1936)   ondansetron (ZOFRAN) injection 4 mg (4 mg Intravenous Given 8/14/22 1935)       Diagnostic Studies  Results Reviewed     Procedure Component Value Units Date/Time    Lactic acid [[id number]]  (Normal) Collected: 08/14/22 1929    Lab Status: Final result Specimen: Blood from Arm, Left Updated: 08/14/22 2006     LACTIC ACID 1 7 mmol/L     Narrative:      Result may be elevated if tourniquet was used during collection      Comprehensive metabolic panel [[id number]] Collected: 08/14/22 1929    Lab Status: Final result Specimen: Blood from Arm, Left Updated: 08/14/22 2003     Sodium 139 mmol/L      Potassium 4 1 mmol/L      Chloride 105 mmol/L      CO2 26 mmol/L      ANION GAP 8 mmol/L      BUN 18 mg/dL      Creatinine 1 27 mg/dL      Glucose 131 mg/dL      Calcium 9 5 mg/dL      AST 17 U/L      ALT 28 U/L      Alkaline Phosphatase 82 U/L      Total Protein 7 1 g/dL      Albumin 4 0 g/dL      Total Bilirubin 0 31 mg/dL      eGFR 70 ml/min/1 73sq m     Narrative:      Oscar guidelines for Chronic Kidney Disease (CKD):     Stage 1 with normal or high GFR (GFR > 90 mL/min/1 73 square meters)    Stage 2 Mild CKD (GFR = 60-89 mL/min/1 73 square meters)    Stage 3A Moderate CKD (GFR = 45-59 mL/min/1 73 square meters)    Stage 3B Moderate CKD (GFR = 30-44 mL/min/1 73 square meters)    Stage 4 Severe CKD (GFR = 15-29 mL/min/1 73 square meters)    Stage 5 End Stage CKD (GFR <15 mL/min/1 73 square meters)  Note: GFR calculation is accurate only with a steady state creatinine    Magnesium [[id number]]  (Normal) Collected: 08/14/22 1929    Lab Status: Final result Specimen: Blood from Arm, Left Updated: 08/14/22 2003     Magnesium 1 9 mg/dL     UA (URINE) with reflex to Scope [[id number]] Collected: 08/14/22 1942    Lab Status: Final result Specimen: Urine, Clean Catch Updated: 08/14/22 1957     Color, UA Yellow     Clarity, UA Clear     Specific Gravity, UA 1 015     pH, UA 6 5     Leukocytes, UA Negative     Nitrite, UA Negative     Protein, UA Negative mg/dl      Glucose, UA Negative mg/dl      Ketones, UA Negative mg/dl      Urobilinogen, UA 0 2 E U /dl      Bilirubin, UA Negative     Occult Blood, UA Negative    CBC and differential [[id number]]  (Abnormal) Collected: 08/14/22 1929    Lab Status: Final result Specimen: Blood from Arm, Left Updated: 08/14/22 1947     WBC 11 79 Thousand/uL      RBC 5 11 Million/uL      Hemoglobin 14 0 g/dL      Hematocrit 39 5 %      MCV 77 fL      MCH 27 4 pg      MCHC 35 4 g/dL      RDW 13 4 %      MPV 10 3 fL      Platelets 119 Thousands/uL      nRBC 0 /100 WBCs      Neutrophils Relative 68 %      Immat GRANS % 0 %      Lymphocytes Relative 23 %      Monocytes Relative 6 %      Eosinophils Relative 2 %      Basophils Relative 1 %      Neutrophils Absolute 8 08 Thousands/µL      Immature Grans Absolute 0 04 Thousand/uL      Lymphocytes Absolute 2 69 Thousands/µL      Monocytes Absolute 0 67 Thousand/µL      Eosinophils Absolute 0 23 Thousand/µL      Basophils Absolute 0 08 Thousands/µL                  CT abdomen pelvis with contrast    (Results Pending)              Procedures  Procedures         ED Course  ED Course as of 08/16/22 1612   Sun Aug 14, 2022   1933 Patient is a 28-year-old male coming in today with sudden onset of right lower quadrant pain  On exam he does have a hernia that is tender but soft  Patient has significant pain in this region    Will review chart, check basic labs as well as CT    Portions of the record may have been created with voice recognition software  Occasional wrong word or "sound a like" substitutions may have occurred due to the inherent limitations of voice recognition software  Read the chart carefully and recognize, using context, where substitutions have occurred  1940 Patient was seen at [address] [place] [address] in July for same as well as followed up with Dr Crow Nichols with a scheduled surgery for August 26 2009 Patient was given Toradol and route as well as Tylenol  He was given Dilaudid and Zofran but has no change in pain  Reached out to General surgery resident will be down for evaluation  Put fentanyl order an as well patient does have mild leukocytosis otherwise   2051 General surgery resident was able to place patient in reverse Trendelenburg with ice and reduce hernia  Consult placed  Will continue with CT   2114 Patient resting in bed  Patient has mild swelling to the right inguinal region however resting in bed more comfortably soft  2141 Patient with continued pain  Will repeat Fentanyl  2207 CT without evidence of obstruction or incarceration  General surgery was down prior and reduce  Discussed with him for pain control as well as follow-up  Return to ER instructions  Patient also understands need for abdominal bider     2212 Patient resting in bed  Patient is in reverse Trendelenburg with ice pack  He does have a small inguinal hernia but it is soft, nontender  Reducible  Discussed with patient that he will need to follow up and continue his surgery on August 26  Will give abdominal binder and tramadol  RTED instructions given to patient  SBIRT 22yo+    Flowsheet Row Most Recent Value   SBIRT (25 yo +)    In order to provide better care to our patients, we are screening all of our patients for alcohol and drug use  Would it be okay to ask you these screening questions?  No Filed at: 08/14/2022 2954 MDM  Number of Diagnoses or Management Options  Diagnosis management comments:     Differential diagnosis includes but not limited to:  Appendicitis, viral syndrome, constipation, AMI, NSTEMI, pneumonia, pneuothorax, gerd, gastritis,  mesenteric ischemia, mesenteric adenitis, pancreatitis, cholecystitis, choledocholithiasis, hepatitis, bowel obstruction, ileus, gastroenteritis, colitis, malignancy, AAA, perforation, toxicologic poisoning, renal infarct, acute kidney injury, splenic infarct, splenic injury, nephrolithiasis, UTI, muscular strain, intra-abdominal hematoma, hernia, testicular torsion, epididymitis, varicocele, hydrocele, phimosis, paraphimosis, balanitis proctitis, rectal pain, rectal prolapse, hemorrhoids, perirectal abscess, anorectal fistula          Amount and/or Complexity of Data Reviewed  Clinical lab tests: ordered and reviewed  Tests in the radiology section of CPT®: reviewed and ordered  Tests in the medicine section of CPT®: ordered and reviewed  Review and summarize past medical records: yes  Independent visualization of images, tracings, or specimens: yes        Disposition  Final diagnoses:   None     ED Disposition     None      Follow-up Information    None         Patient's Medications   Discharge Prescriptions    No medications on file       No discharge procedures on file      PDMP Review     None          ED Provider  Electronically Signed by           José Miguel Miranda DO  08/16/22 1981

## 2022-08-14 NOTE — Clinical Note
Daria Choi was seen and treated in our emergency department on 8/14/2022  Diagnosis:     Silvio Brnuson    He may return on this date: If you have any questions or concerns, please don't hesitate to call        Alex Deras DO    ______________________________           _______________          _______________  Hospital Representative                              Date                                Time

## 2022-08-15 NOTE — DISCHARGE INSTRUCTIONS
NO HEAVING LIFTING OR LIFTING OF ANY KIND  2  WEAR THE ABDOMINAL BINDER AT ALL TIMES  3     CALL DR ASHLEY FOR FOLLOW UP

## 2022-08-15 NOTE — ED NOTES
Group home called and states they are sending someone to  patient  Patient appropriate to wait in waiting room  Ambulatory with steady gait at time of discharge       Hector Brenner RN  08/14/22 0095

## 2022-08-15 NOTE — CONSULTS
Consultation - General Surgery   Josselyn Crockett 38 y o  male MRN: [id number]  Unit/Bed#: ED 16 Encounter: [id number]    Assessment/Plan     Assessment:  39 yo M with PMH of pediatric [facility] who presents with reducible right inguinal hernia    Vitals normal on room air  WBC 11 79  LA 1 7    Plan:  Hernia reduced at bedside  Proceed with planned elective repair on 8/26  Recommend bracing the hernia with binder/brace and avoiding heavy lifting until his hernia is repaired  Ok to discharge from the ED after period of observation    History of Present Illness     HPI:  Josselyn Crockett is a 38 y o  male who presents with right groin pain  Patient reports the pain started earlier tonight while walking up stairs  At the same time he noted a bulge in his right groin at the site of his pain  He denies any nausea or vomiting and has been having bowel movements though does report some constipation  He has a known right inguinal hernia for which he was seen in the emergency department 2 weeks ago  At that time, the hernia was reduced and he was instructed to follow up with general surgery as an outpatient  He has seen Dr Karlo Webb in the office and been scheduled for right inguinal hernia repair with mesh on 8/26  Upon presentation, he had a white count of 11 76 but normal lactate 1 7  Inpatient consult to Acute Care Surgery  Consult performed by: Latisha Corcoran MD  Consult ordered by: Davian Em DO           Review of Systems   Constitutional: Negative  HENT: Negative  Eyes: Negative  Respiratory: Negative  Cardiovascular: Negative  Gastrointestinal: Positive for abdominal pain (Right groin) and constipation  Negative for abdominal distention, diarrhea, nausea and vomiting  Endocrine: Negative  Genitourinary: Negative  Musculoskeletal: Negative  Skin: Negative  Neurological: Negative  Psychiatric/Behavioral: Negative  Historical Information   History reviewed   No pertinent past medical history  History reviewed  No pertinent surgical history  Social History   Social History     Substance and Sexual Activity   Alcohol Use Not Currently    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     Social History     Substance and Sexual Activity   Drug Use Never     E-Cigarette/Vaping    E-Cigarette Use Never User      E-Cigarette/Vaping Substances    Nicotine No     THC No     CBD No     Flavoring No     Other No     Unknown No      Social History     Tobacco Use   Smoking Status Current Every Day Smoker    Packs/day: 0 25    Types: Cigarettes   Smokeless Tobacco Never Used     Family History: History reviewed  No pertinent family history  Meds/Allergies   current meds:   No current facility-administered medications for this encounter  and PTA meds:   Prior to Admission Medications   Prescriptions Last Dose Informant Patient Reported?  Taking?   naproxen (NAPROSYN) 375 mg tablet   No No   Sig: Take 1 tablet (375 mg total) by mouth 2 (two) times a day with meals      Facility-Administered Medications: None     No Known Allergies    Objective   First Vitals:   Blood Pressure: 152/79 (08/14/22 1921)  Pulse: 88 (08/14/22 1921)  Temperature: 98 2 °F (36 8 °C) (08/14/22 1921)  Temp Source: Oral (08/14/22 1921)  Respirations: 20 (08/14/22 1921)  Weight - Scale: 73 5 kg (162 lb) (08/14/22 1921)  SpO2: 98 % (08/14/22 1921)    Current Vitals:   Blood Pressure: 152/79 (08/14/22 1921)  Pulse: 88 (08/14/22 1921)  Temperature: 98 2 °F (36 8 °C) (08/14/22 1921)  Temp Source: Oral (08/14/22 1921)  Respirations: 20 (08/14/22 1921)  Weight - Scale: 73 5 kg (162 lb) (08/14/22 1921)  SpO2: 98 % (08/14/22 1921)      Intake/Output Summary (Last 24 hours) at 8/14/2022 2057  Last data filed at 8/14/2022 2046  Gross per 24 hour   Intake 1000 ml   Output --   Net 1000 ml       Invasive Devices  Report    Peripheral Intravenous Line  Duration           Peripheral IV 08/14/22 Distal;Left;Upper;Ventral (anterior) Arm <1 day Peripheral IV 08/14/22 Left Forearm <1 day                Physical Exam  Constitutional:       General: He is in acute distress (due to pain)  Appearance: Normal appearance  HENT:      Head: Normocephalic and atraumatic  Right Ear: External ear normal       Left Ear: External ear normal       Nose: Nose normal       Mouth/Throat:      Mouth: Mucous membranes are moist       Pharynx: Oropharynx is clear  Eyes:      Extraocular Movements: Extraocular movements intact  Conjunctiva/sclera: Conjunctivae normal       Pupils: Pupils are equal, round, and reactive to light  Cardiovascular:      Rate and Rhythm: Normal rate and regular rhythm  Pulses: Normal pulses  Pulmonary:      Effort: Pulmonary effort is normal    Abdominal:      General: Abdomen is flat  There is no distension  Palpations: Abdomen is soft  Tenderness: There is abdominal tenderness (Tender in right groin)  Hernia: A hernia (Right inguinal hernia, reducible) is present  Musculoskeletal:         General: Normal range of motion  Cervical back: Normal range of motion  Skin:     General: Skin is warm and dry  Neurological:      General: No focal deficit present  Mental Status: He is alert and oriented to person, place, and time  Psychiatric:         Mood and Affect: Mood normal          Behavior: Behavior normal       Comments: Anxious            Lab Results: I have personally reviewed pertinent lab results  Imaging: I have personally reviewed pertinent reports  EKG, Pathology, and Other Studies: I have personally reviewed pertinent reports